# Patient Record
Sex: MALE | Race: OTHER
[De-identification: names, ages, dates, MRNs, and addresses within clinical notes are randomized per-mention and may not be internally consistent; named-entity substitution may affect disease eponyms.]

---

## 2020-12-13 ENCOUNTER — HOSPITAL ENCOUNTER (INPATIENT)
Dept: HOSPITAL 15 - ER | Age: 60
LOS: 2 days | Discharge: HOME | DRG: 199 | End: 2020-12-15
Attending: INTERNAL MEDICINE | Admitting: NURSE PRACTITIONER
Payer: COMMERCIAL

## 2020-12-13 VITALS — WEIGHT: 195.77 LBS | BODY MASS INDEX: 30.73 KG/M2 | HEIGHT: 67 IN

## 2020-12-13 DIAGNOSIS — Z23: ICD-10-CM

## 2020-12-13 DIAGNOSIS — I16.0: Primary | ICD-10-CM

## 2020-12-13 DIAGNOSIS — Z79.899: ICD-10-CM

## 2020-12-13 DIAGNOSIS — Z20.828: ICD-10-CM

## 2020-12-13 DIAGNOSIS — E66.9: ICD-10-CM

## 2020-12-13 DIAGNOSIS — E03.9: ICD-10-CM

## 2020-12-13 DIAGNOSIS — I10: ICD-10-CM

## 2020-12-13 LAB
ALBUMIN SERPL-MCNC: 3.4 G/DL (ref 3.4–5)
ALP SERPL-CCNC: 80 U/L (ref 45–117)
ALT SERPL-CCNC: 36 U/L (ref 16–61)
ANION GAP SERPL CALCULATED.3IONS-SCNC: 7 MMOL/L (ref 5–15)
APTT PPP: 26.2 SEC (ref 23–31.2)
BILIRUB SERPL-MCNC: 0.2 MG/DL (ref 0.2–1)
BUN SERPL-MCNC: 15 MG/DL (ref 7–18)
BUN/CREAT SERPL: 14.4
CALCIUM SERPL-MCNC: 8.7 MG/DL (ref 8.5–10.1)
CHLORIDE SERPL-SCNC: 107 MMOL/L (ref 98–107)
CO2 SERPL-SCNC: 27 MMOL/L (ref 21–32)
GLUCOSE SERPL-MCNC: 115 MG/DL (ref 74–106)
HCT VFR BLD AUTO: 48 % (ref 41–53)
HGB BLD-MCNC: 16.1 G/DL (ref 13.5–17.5)
INR PPP: 0.97 (ref 0.9–1.15)
MCH RBC QN AUTO: 27.5 PG (ref 28–32)
MCV RBC AUTO: 82.1 FL (ref 80–100)
NRBC BLD QL AUTO: 0.1 %
POTASSIUM SERPL-SCNC: 3.5 MMOL/L (ref 3.5–5.1)
PROT SERPL-MCNC: 6.9 G/DL (ref 6.4–8.2)
SODIUM SERPL-SCNC: 141 MMOL/L (ref 136–145)

## 2020-12-13 PROCEDURE — 85610 PROTHROMBIN TIME: CPT

## 2020-12-13 PROCEDURE — 78452 HT MUSCLE IMAGE SPECT MULT: CPT

## 2020-12-13 PROCEDURE — 36415 COLL VENOUS BLD VENIPUNCTURE: CPT

## 2020-12-13 PROCEDURE — 80053 COMPREHEN METABOLIC PANEL: CPT

## 2020-12-13 PROCEDURE — 84484 ASSAY OF TROPONIN QUANT: CPT

## 2020-12-13 PROCEDURE — 83735 ASSAY OF MAGNESIUM: CPT

## 2020-12-13 PROCEDURE — 80061 LIPID PANEL: CPT

## 2020-12-13 PROCEDURE — 85730 THROMBOPLASTIN TIME PARTIAL: CPT

## 2020-12-13 PROCEDURE — 84443 ASSAY THYROID STIM HORMONE: CPT

## 2020-12-13 PROCEDURE — 71045 X-RAY EXAM CHEST 1 VIEW: CPT

## 2020-12-13 PROCEDURE — 85025 COMPLETE CBC W/AUTO DIFF WBC: CPT

## 2020-12-13 PROCEDURE — 83880 ASSAY OF NATRIURETIC PEPTIDE: CPT

## 2020-12-13 PROCEDURE — 87426 SARSCOV CORONAVIRUS AG IA: CPT

## 2020-12-13 PROCEDURE — 93017 CV STRESS TEST TRACING ONLY: CPT

## 2020-12-13 PROCEDURE — 93005 ELECTROCARDIOGRAM TRACING: CPT

## 2020-12-14 VITALS — DIASTOLIC BLOOD PRESSURE: 94 MMHG | SYSTOLIC BLOOD PRESSURE: 161 MMHG

## 2020-12-14 LAB
CHOLEST SERPL-MCNC: 160 MG/DL (ref ?–200)
HDLC SERPL-MCNC: 32 MG/DL (ref 40–59)
MAGNESIUM SERPL-MCNC: 2.8 MG/DL (ref 1.6–2.6)
TRIGL SERPL-MCNC: 145 MG/DL (ref ?–150)

## 2020-12-14 RX ADMIN — FAMOTIDINE SCH MG: 20 TABLET, FILM COATED ORAL at 16:13

## 2020-12-14 RX ADMIN — METOPROLOL TARTRATE SCH MG: 25 TABLET, FILM COATED ORAL at 16:13

## 2020-12-14 RX ADMIN — ENOXAPARIN SODIUM SCH MG: 40 INJECTION SUBCUTANEOUS at 16:13

## 2020-12-15 VITALS — SYSTOLIC BLOOD PRESSURE: 151 MMHG | DIASTOLIC BLOOD PRESSURE: 95 MMHG

## 2020-12-15 VITALS — DIASTOLIC BLOOD PRESSURE: 79 MMHG | SYSTOLIC BLOOD PRESSURE: 139 MMHG

## 2020-12-15 RX ADMIN — FAMOTIDINE SCH MG: 20 TABLET, FILM COATED ORAL at 05:35

## 2020-12-15 RX ADMIN — METOPROLOL TARTRATE SCH MG: 25 TABLET, FILM COATED ORAL at 05:35

## 2020-12-15 RX ADMIN — FAMOTIDINE SCH MG: 20 TABLET, FILM COATED ORAL at 10:00

## 2020-12-15 RX ADMIN — METOPROLOL TARTRATE SCH MG: 25 TABLET, FILM COATED ORAL at 10:36

## 2020-12-15 RX ADMIN — ENOXAPARIN SODIUM SCH MG: 40 INJECTION SUBCUTANEOUS at 10:00

## 2020-12-15 NOTE — NUR
DISCHARGE INSTRUCTIONS AND RX GIVEN TO PT. VERBALIZED UNDERSTANDING. IV AND TELE BOX 
REMOVED. ALL APPROPRIATE PAPERWORK SIGNED. PT DISCHARGED HOME WITH FAMILY IN ATTENDANCE.

## 2020-12-15 NOTE — NUR
Telemetry admit from ER

MARCIA LEMUS admitted to Telemetry. Patient oriented to Kim Post, primary RN, unit, 
room, bed, and unit policies regarding patient care and visiting hours. Patient now on 
continuous telemetry monitoring, tele box #38 and telemetry reading on arrival to unit is SR 
70. Patient IS ON ROOM AIR; SAT O2 IS 94%, weighed by bed scale and encouraged to call if 
they need something. All questions and concerns addressed, patient verbalized understanding. 


PATIENT DENIES CHEST PAIN AND DENIES CHEST PRESSURE AT THIS TIME. PATIENT BELONGINGS FORM 
SIGNED AND CHARTED. WILL CONTINUE TO MONITOR Q1H AND PRN.

## 2020-12-15 NOTE — NUR
CLOSING NOTE- NOC SHIFT

ENDORSED PATIENT CARE TO DAY SHIFT NURSE RICHELLE RAMOS.

PATIENT IS COMFORTABLE IN BED. NO S/SX OF DISTRESS, SOB OR PAIN.

## 2020-12-15 NOTE — NUR
Regarding social service consult for advance directive, no insurance, no PCP. 



Patient discharge prior to assessment.

## 2021-10-11 ENCOUNTER — HOSPITAL ENCOUNTER (INPATIENT)
Dept: HOSPITAL 15 - ER | Age: 61
LOS: 3 days | Discharge: HOME | DRG: 203 | End: 2021-10-14
Attending: FAMILY MEDICINE | Admitting: NURSE PRACTITIONER
Payer: MEDICAID

## 2021-10-11 VITALS — SYSTOLIC BLOOD PRESSURE: 132 MMHG | DIASTOLIC BLOOD PRESSURE: 77 MMHG

## 2021-10-11 VITALS — HEIGHT: 67 IN | WEIGHT: 226.41 LBS | BODY MASS INDEX: 35.54 KG/M2

## 2021-10-11 VITALS — DIASTOLIC BLOOD PRESSURE: 68 MMHG | SYSTOLIC BLOOD PRESSURE: 127 MMHG

## 2021-10-11 VITALS — DIASTOLIC BLOOD PRESSURE: 84 MMHG | SYSTOLIC BLOOD PRESSURE: 170 MMHG

## 2021-10-11 VITALS — SYSTOLIC BLOOD PRESSURE: 127 MMHG | DIASTOLIC BLOOD PRESSURE: 68 MMHG

## 2021-10-11 DIAGNOSIS — E66.9: ICD-10-CM

## 2021-10-11 DIAGNOSIS — R07.89: Primary | ICD-10-CM

## 2021-10-11 DIAGNOSIS — Z82.49: ICD-10-CM

## 2021-10-11 DIAGNOSIS — Z86.16: ICD-10-CM

## 2021-10-11 DIAGNOSIS — G47.33: ICD-10-CM

## 2021-10-11 DIAGNOSIS — E78.5: ICD-10-CM

## 2021-10-11 DIAGNOSIS — I10: ICD-10-CM

## 2021-10-11 DIAGNOSIS — Z20.822: ICD-10-CM

## 2021-10-11 DIAGNOSIS — E78.1: ICD-10-CM

## 2021-10-11 LAB
ALBUMIN SERPL-MCNC: 3.4 G/DL (ref 3.4–5)
ALBUMIN SERPL-MCNC: 3.6 G/DL (ref 3.4–5)
ALP SERPL-CCNC: 80 U/L (ref 45–117)
ALP SERPL-CCNC: 83 U/L (ref 45–117)
ALT SERPL-CCNC: 54 U/L (ref 16–61)
ALT SERPL-CCNC: 54 U/L (ref 16–61)
ANION GAP SERPL CALCULATED.3IONS-SCNC: 4 MMOL/L (ref 5–15)
ANION GAP SERPL CALCULATED.3IONS-SCNC: 4 MMOL/L (ref 5–15)
BILIRUB SERPL-MCNC: 0.3 MG/DL (ref 0.2–1)
BILIRUB SERPL-MCNC: 0.4 MG/DL (ref 0.2–1)
BUN SERPL-MCNC: 17 MG/DL (ref 7–18)
BUN SERPL-MCNC: 22 MG/DL (ref 7–18)
BUN/CREAT SERPL: 16.2
BUN/CREAT SERPL: 20.6
CALCIUM SERPL-MCNC: 9.2 MG/DL (ref 8.5–10.1)
CALCIUM SERPL-MCNC: 9.5 MG/DL (ref 8.5–10.1)
CHLORIDE SERPL-SCNC: 109 MMOL/L (ref 98–107)
CHLORIDE SERPL-SCNC: 110 MMOL/L (ref 98–107)
CHOLEST SERPL-MCNC: 127 MG/DL (ref ?–200)
CO2 SERPL-SCNC: 28 MMOL/L (ref 21–32)
CO2 SERPL-SCNC: 29 MMOL/L (ref 21–32)
GLUCOSE SERPL-MCNC: 110 MG/DL (ref 74–106)
GLUCOSE SERPL-MCNC: 118 MG/DL (ref 74–106)
HCT VFR BLD AUTO: 48.3 % (ref 41–53)
HCT VFR BLD AUTO: 48.8 % (ref 41–53)
HDLC SERPL-MCNC: 31 MG/DL (ref 40–59)
HGB BLD-MCNC: 16.3 G/DL (ref 13.5–17.5)
HGB BLD-MCNC: 16.4 G/DL (ref 13.5–17.5)
INR PPP: 1.02 (ref 0.9–1.15)
MCH RBC QN AUTO: 27.9 PG (ref 28–32)
MCH RBC QN AUTO: 28 PG (ref 28–32)
MCV RBC AUTO: 82.6 FL (ref 80–100)
MCV RBC AUTO: 83.4 FL (ref 80–100)
NRBC BLD QL AUTO: 0.1 %
NRBC BLD QL AUTO: 0.2 %
POTASSIUM SERPL-SCNC: 4.7 MMOL/L (ref 3.5–5.1)
POTASSIUM SERPL-SCNC: 5.1 MMOL/L (ref 3.5–5.1)
PROT SERPL-MCNC: 6.8 G/DL (ref 6.4–8.2)
PROT SERPL-MCNC: 7 G/DL (ref 6.4–8.2)
SODIUM SERPL-SCNC: 141 MMOL/L (ref 136–145)
SODIUM SERPL-SCNC: 143 MMOL/L (ref 136–145)
TRIGL SERPL-MCNC: 315 MG/DL (ref ?–150)

## 2021-10-11 PROCEDURE — 84439 ASSAY OF FREE THYROXINE: CPT

## 2021-10-11 PROCEDURE — 71045 X-RAY EXAM CHEST 1 VIEW: CPT

## 2021-10-11 PROCEDURE — 93017 CV STRESS TEST TRACING ONLY: CPT

## 2021-10-11 PROCEDURE — 83880 ASSAY OF NATRIURETIC PEPTIDE: CPT

## 2021-10-11 PROCEDURE — 96374 THER/PROPH/DIAG INJ IV PUSH: CPT

## 2021-10-11 PROCEDURE — 85025 COMPLETE CBC W/AUTO DIFF WBC: CPT

## 2021-10-11 PROCEDURE — 85379 FIBRIN DEGRADATION QUANT: CPT

## 2021-10-11 PROCEDURE — 87426 SARSCOV CORONAVIRUS AG IA: CPT

## 2021-10-11 PROCEDURE — 82805 BLOOD GASES W/O2 SATURATION: CPT

## 2021-10-11 PROCEDURE — 93306 TTE W/DOPPLER COMPLETE: CPT

## 2021-10-11 PROCEDURE — 93005 ELECTROCARDIOGRAM TRACING: CPT

## 2021-10-11 PROCEDURE — 80061 LIPID PANEL: CPT

## 2021-10-11 PROCEDURE — 84443 ASSAY THYROID STIM HORMONE: CPT

## 2021-10-11 PROCEDURE — 83036 HEMOGLOBIN GLYCOSYLATED A1C: CPT

## 2021-10-11 PROCEDURE — 36600 WITHDRAWAL OF ARTERIAL BLOOD: CPT

## 2021-10-11 PROCEDURE — 80053 COMPREHEN METABOLIC PANEL: CPT

## 2021-10-11 PROCEDURE — 96375 TX/PRO/DX INJ NEW DRUG ADDON: CPT

## 2021-10-11 PROCEDURE — 84484 ASSAY OF TROPONIN QUANT: CPT

## 2021-10-11 PROCEDURE — 85610 PROTHROMBIN TIME: CPT

## 2021-10-11 PROCEDURE — 36415 COLL VENOUS BLD VENIPUNCTURE: CPT

## 2021-10-11 RX ADMIN — ATORVASTATIN CALCIUM SCH MG: 20 TABLET, FILM COATED ORAL at 23:35

## 2021-10-11 RX ADMIN — METOPROLOL TARTRATE SCH MG: 25 TABLET, FILM COATED ORAL at 23:35

## 2021-10-11 RX ADMIN — MULTIVITAMIN TABLET SCH TAB: TABLET at 11:20

## 2021-10-11 RX ADMIN — METOPROLOL TARTRATE SCH MG: 25 TABLET, FILM COATED ORAL at 22:00

## 2021-10-11 RX ADMIN — METOPROLOL TARTRATE SCH MG: 25 TABLET, FILM COATED ORAL at 11:19

## 2021-10-11 RX ADMIN — HYDROCODONE BITARTRATE AND ACETAMINOPHEN PRN TAB: 5; 325 TABLET ORAL at 15:58

## 2021-10-11 RX ADMIN — SODIUM CHLORIDE SCH ML: 9 INJECTION INTRAMUSCULAR; INTRAVENOUS; SUBCUTANEOUS at 23:35

## 2021-10-11 RX ADMIN — SODIUM CHLORIDE SCH ML: 9 INJECTION INTRAMUSCULAR; INTRAVENOUS; SUBCUTANEOUS at 13:02

## 2021-10-12 VITALS — DIASTOLIC BLOOD PRESSURE: 89 MMHG | SYSTOLIC BLOOD PRESSURE: 147 MMHG

## 2021-10-12 VITALS — DIASTOLIC BLOOD PRESSURE: 72 MMHG | SYSTOLIC BLOOD PRESSURE: 123 MMHG

## 2021-10-12 VITALS — DIASTOLIC BLOOD PRESSURE: 79 MMHG | SYSTOLIC BLOOD PRESSURE: 145 MMHG

## 2021-10-12 VITALS — SYSTOLIC BLOOD PRESSURE: 145 MMHG | DIASTOLIC BLOOD PRESSURE: 81 MMHG

## 2021-10-12 VITALS — SYSTOLIC BLOOD PRESSURE: 129 MMHG | DIASTOLIC BLOOD PRESSURE: 81 MMHG

## 2021-10-12 LAB
ALBUMIN SERPL-MCNC: 3.1 G/DL (ref 3.4–5)
ALP SERPL-CCNC: 78 U/L (ref 45–117)
ALT SERPL-CCNC: 49 U/L (ref 16–61)
ANION GAP SERPL CALCULATED.3IONS-SCNC: 5 MMOL/L (ref 5–15)
BILIRUB SERPL-MCNC: 0.6 MG/DL (ref 0.2–1)
BUN SERPL-MCNC: 15 MG/DL (ref 7–18)
BUN/CREAT SERPL: 14.4
CALCIUM SERPL-MCNC: 8.6 MG/DL (ref 8.5–10.1)
CHLORIDE SERPL-SCNC: 110 MMOL/L (ref 98–107)
CO2 SERPL-SCNC: 25 MMOL/L (ref 21–32)
GLUCOSE SERPL-MCNC: 109 MG/DL (ref 74–106)
HCT VFR BLD AUTO: 49.8 % (ref 41–53)
HGB BLD-MCNC: 16.6 G/DL (ref 13.5–17.5)
MCH RBC QN AUTO: 27.6 PG (ref 28–32)
MCV RBC AUTO: 82.5 FL (ref 80–100)
NRBC BLD QL AUTO: 0.1 %
POTASSIUM SERPL-SCNC: 4.3 MMOL/L (ref 3.5–5.1)
PROT SERPL-MCNC: 6.6 G/DL (ref 6.4–8.2)
SODIUM SERPL-SCNC: 140 MMOL/L (ref 136–145)

## 2021-10-12 RX ADMIN — ATORVASTATIN CALCIUM SCH MG: 20 TABLET, FILM COATED ORAL at 22:41

## 2021-10-12 RX ADMIN — SODIUM CHLORIDE SCH ML: 9 INJECTION INTRAMUSCULAR; INTRAVENOUS; SUBCUTANEOUS at 05:14

## 2021-10-12 RX ADMIN — FAMOTIDINE SCH MG: 20 TABLET, FILM COATED ORAL at 10:45

## 2021-10-12 RX ADMIN — SODIUM CHLORIDE SCH ML: 9 INJECTION INTRAMUSCULAR; INTRAVENOUS; SUBCUTANEOUS at 22:41

## 2021-10-12 RX ADMIN — MULTIVITAMIN TABLET SCH TAB: TABLET at 10:45

## 2021-10-12 RX ADMIN — HYDROCODONE BITARTRATE AND ACETAMINOPHEN PRN TAB: 5; 325 TABLET ORAL at 22:43

## 2021-10-12 RX ADMIN — METOPROLOL TARTRATE SCH MG: 25 TABLET, FILM COATED ORAL at 22:42

## 2021-10-12 RX ADMIN — METOPROLOL TARTRATE SCH MG: 25 TABLET, FILM COATED ORAL at 10:45

## 2021-10-12 RX ADMIN — SODIUM CHLORIDE SCH ML: 9 INJECTION INTRAMUSCULAR; INTRAVENOUS; SUBCUTANEOUS at 17:16

## 2021-10-13 VITALS — DIASTOLIC BLOOD PRESSURE: 89 MMHG | SYSTOLIC BLOOD PRESSURE: 144 MMHG

## 2021-10-13 VITALS — SYSTOLIC BLOOD PRESSURE: 139 MMHG | DIASTOLIC BLOOD PRESSURE: 90 MMHG

## 2021-10-13 VITALS — SYSTOLIC BLOOD PRESSURE: 142 MMHG | DIASTOLIC BLOOD PRESSURE: 88 MMHG

## 2021-10-13 VITALS — SYSTOLIC BLOOD PRESSURE: 147 MMHG | DIASTOLIC BLOOD PRESSURE: 87 MMHG

## 2021-10-13 VITALS — SYSTOLIC BLOOD PRESSURE: 120 MMHG | DIASTOLIC BLOOD PRESSURE: 77 MMHG

## 2021-10-13 VITALS — DIASTOLIC BLOOD PRESSURE: 88 MMHG | SYSTOLIC BLOOD PRESSURE: 142 MMHG

## 2021-10-13 RX ADMIN — FAMOTIDINE SCH MG: 20 TABLET, FILM COATED ORAL at 10:09

## 2021-10-13 RX ADMIN — MULTIVITAMIN TABLET SCH TAB: TABLET at 10:00

## 2021-10-13 RX ADMIN — METOPROLOL TARTRATE SCH MG: 25 TABLET, FILM COATED ORAL at 10:00

## 2021-10-13 RX ADMIN — SODIUM CHLORIDE SCH ML: 9 INJECTION INTRAMUSCULAR; INTRAVENOUS; SUBCUTANEOUS at 14:30

## 2021-10-13 RX ADMIN — METOPROLOL TARTRATE SCH MG: 25 TABLET, FILM COATED ORAL at 22:06

## 2021-10-13 RX ADMIN — SODIUM CHLORIDE SCH ML: 9 INJECTION INTRAMUSCULAR; INTRAVENOUS; SUBCUTANEOUS at 05:34

## 2021-10-13 RX ADMIN — SODIUM CHLORIDE SCH ML: 9 INJECTION INTRAMUSCULAR; INTRAVENOUS; SUBCUTANEOUS at 22:05

## 2021-10-14 VITALS — SYSTOLIC BLOOD PRESSURE: 121 MMHG | DIASTOLIC BLOOD PRESSURE: 79 MMHG

## 2021-10-14 VITALS — SYSTOLIC BLOOD PRESSURE: 118 MMHG | DIASTOLIC BLOOD PRESSURE: 58 MMHG

## 2021-10-14 VITALS — SYSTOLIC BLOOD PRESSURE: 118 MMHG | DIASTOLIC BLOOD PRESSURE: 80 MMHG

## 2021-10-14 VITALS — SYSTOLIC BLOOD PRESSURE: 140 MMHG | DIASTOLIC BLOOD PRESSURE: 84 MMHG

## 2021-10-14 VITALS — SYSTOLIC BLOOD PRESSURE: 126 MMHG | DIASTOLIC BLOOD PRESSURE: 79 MMHG

## 2021-10-14 VITALS — DIASTOLIC BLOOD PRESSURE: 79 MMHG | SYSTOLIC BLOOD PRESSURE: 121 MMHG

## 2021-10-14 RX ADMIN — MULTIVITAMIN TABLET SCH TAB: TABLET at 09:26

## 2021-10-14 RX ADMIN — SODIUM CHLORIDE SCH ML: 9 INJECTION INTRAMUSCULAR; INTRAVENOUS; SUBCUTANEOUS at 14:37

## 2021-10-14 RX ADMIN — METOPROLOL TARTRATE SCH MG: 25 TABLET, FILM COATED ORAL at 09:28

## 2021-10-14 RX ADMIN — SODIUM CHLORIDE SCH ML: 9 INJECTION INTRAMUSCULAR; INTRAVENOUS; SUBCUTANEOUS at 06:14

## 2021-10-14 RX ADMIN — FAMOTIDINE SCH MG: 20 TABLET, FILM COATED ORAL at 09:26

## 2022-06-16 ENCOUNTER — HOSPITAL ENCOUNTER (EMERGENCY)
Dept: HOSPITAL 15 - ER | Age: 62
Discharge: HOME | End: 2022-06-16
Payer: MEDICAID

## 2022-06-16 VITALS — DIASTOLIC BLOOD PRESSURE: 95 MMHG | SYSTOLIC BLOOD PRESSURE: 146 MMHG

## 2022-06-16 VITALS — HEIGHT: 67 IN | WEIGHT: 210 LBS | BODY MASS INDEX: 32.96 KG/M2

## 2022-06-16 DIAGNOSIS — I10: ICD-10-CM

## 2022-06-16 DIAGNOSIS — J18.9: Primary | ICD-10-CM

## 2022-06-16 DIAGNOSIS — I25.10: ICD-10-CM

## 2022-06-16 LAB
ALBUMIN SERPL-MCNC: 3.9 G/DL (ref 3.4–5)
ALP SERPL-CCNC: 102 U/L (ref 45–117)
ALT SERPL-CCNC: 54 U/L (ref 16–61)
ANION GAP SERPL CALCULATED.3IONS-SCNC: 7 MMOL/L (ref 5–15)
BILIRUB SERPL-MCNC: 0.6 MG/DL (ref 0.2–1)
BUN SERPL-MCNC: 12 MG/DL (ref 7–18)
BUN/CREAT SERPL: 11.7
CALCIUM SERPL-MCNC: 9.2 MG/DL (ref 8.5–10.1)
CHLORIDE SERPL-SCNC: 109 MMOL/L (ref 98–107)
CO2 SERPL-SCNC: 28 MMOL/L (ref 21–32)
GLUCOSE SERPL-MCNC: 105 MG/DL (ref 74–106)
HCT VFR BLD AUTO: 48.8 % (ref 41–53)
HGB BLD-MCNC: 16.2 G/DL (ref 13.5–17.5)
MCH RBC QN AUTO: 27.2 PG (ref 28–32)
MCV RBC AUTO: 82.1 FL (ref 80–100)
NRBC BLD QL AUTO: 0.1 %
POTASSIUM SERPL-SCNC: 4.7 MMOL/L (ref 3.5–5.1)
PROT SERPL-MCNC: 7.8 G/DL (ref 6.4–8.2)
SODIUM SERPL-SCNC: 144 MMOL/L (ref 136–145)

## 2022-06-16 PROCEDURE — 85025 COMPLETE CBC W/AUTO DIFF WBC: CPT

## 2022-06-16 PROCEDURE — 93005 ELECTROCARDIOGRAM TRACING: CPT

## 2022-06-16 PROCEDURE — 84484 ASSAY OF TROPONIN QUANT: CPT

## 2022-06-16 PROCEDURE — 71045 X-RAY EXAM CHEST 1 VIEW: CPT

## 2022-06-16 PROCEDURE — 80053 COMPREHEN METABOLIC PANEL: CPT

## 2022-06-16 PROCEDURE — 85379 FIBRIN DEGRADATION QUANT: CPT

## 2022-06-16 PROCEDURE — 36415 COLL VENOUS BLD VENIPUNCTURE: CPT

## 2022-11-28 ENCOUNTER — HOSPITAL ENCOUNTER (EMERGENCY)
Dept: HOSPITAL 15 - ER | Age: 62
Discharge: HOME | End: 2022-11-28
Payer: MEDICAID

## 2022-11-28 VITALS — DIASTOLIC BLOOD PRESSURE: 109 MMHG | SYSTOLIC BLOOD PRESSURE: 178 MMHG

## 2022-11-28 VITALS — HEIGHT: 62 IN | BODY MASS INDEX: 38.54 KG/M2 | WEIGHT: 209.44 LBS

## 2022-11-28 DIAGNOSIS — I10: ICD-10-CM

## 2022-11-28 DIAGNOSIS — R07.89: Primary | ICD-10-CM

## 2022-11-28 LAB
ALBUMIN SERPL-MCNC: 3.9 G/DL (ref 3.4–5)
ALP SERPL-CCNC: 70 U/L (ref 45–117)
ALT SERPL-CCNC: 95 U/L (ref 16–61)
ANION GAP SERPL CALCULATED.3IONS-SCNC: 9 MMOL/L (ref 5–15)
BILIRUB SERPL-MCNC: 0.6 MG/DL (ref 0.2–1)
BUN SERPL-MCNC: 15 MG/DL (ref 7–18)
BUN/CREAT SERPL: 12.6
CALCIUM SERPL-MCNC: 9.6 MG/DL (ref 8.5–10.1)
CHLORIDE SERPL-SCNC: 107 MMOL/L (ref 98–107)
CO2 SERPL-SCNC: 26 MMOL/L (ref 21–32)
GLUCOSE SERPL-MCNC: 109 MG/DL (ref 74–106)
HCT VFR BLD AUTO: 51 % (ref 41–53)
HGB BLD-MCNC: 16.8 G/DL (ref 13.5–17.5)
MCH RBC QN AUTO: 27 PG (ref 28–32)
MCV RBC AUTO: 81.8 FL (ref 80–100)
NRBC BLD QL AUTO: 0.1 %
POTASSIUM SERPL-SCNC: 4.2 MMOL/L (ref 3.5–5.1)
PROT SERPL-MCNC: 7.3 G/DL (ref 6.4–8.2)
SODIUM SERPL-SCNC: 142 MMOL/L (ref 136–145)

## 2022-11-28 PROCEDURE — 83880 ASSAY OF NATRIURETIC PEPTIDE: CPT

## 2022-11-28 PROCEDURE — 36415 COLL VENOUS BLD VENIPUNCTURE: CPT

## 2022-11-28 PROCEDURE — 85025 COMPLETE CBC W/AUTO DIFF WBC: CPT

## 2022-11-28 PROCEDURE — 93005 ELECTROCARDIOGRAM TRACING: CPT

## 2022-11-28 PROCEDURE — 85379 FIBRIN DEGRADATION QUANT: CPT

## 2022-11-28 PROCEDURE — 80053 COMPREHEN METABOLIC PANEL: CPT

## 2022-11-28 PROCEDURE — 71045 X-RAY EXAM CHEST 1 VIEW: CPT

## 2022-11-28 PROCEDURE — 84484 ASSAY OF TROPONIN QUANT: CPT

## 2024-10-20 ENCOUNTER — HOSPITAL ENCOUNTER (INPATIENT)
Dept: HOSPITAL 15 - ER | Age: 64
LOS: 3 days | Discharge: HOME | DRG: 192 | End: 2024-10-23
Attending: STUDENT IN AN ORGANIZED HEALTH CARE EDUCATION/TRAINING PROGRAM | Admitting: STUDENT IN AN ORGANIZED HEALTH CARE EDUCATION/TRAINING PROGRAM
Payer: MEDICAID

## 2024-10-20 VITALS — HEART RATE: 60 BPM | OXYGEN SATURATION: 98 % | RESPIRATION RATE: 17 BRPM

## 2024-10-20 VITALS — OXYGEN SATURATION: 97 %

## 2024-10-20 VITALS
SYSTOLIC BLOOD PRESSURE: 141 MMHG | RESPIRATION RATE: 18 BRPM | OXYGEN SATURATION: 97 % | TEMPERATURE: 98.3 F | DIASTOLIC BLOOD PRESSURE: 70 MMHG | HEART RATE: 52 BPM

## 2024-10-20 VITALS — BODY MASS INDEX: 33.43 KG/M2 | WEIGHT: 212.97 LBS | HEIGHT: 67 IN

## 2024-10-20 DIAGNOSIS — Z82.49: ICD-10-CM

## 2024-10-20 DIAGNOSIS — Z79.899: ICD-10-CM

## 2024-10-20 DIAGNOSIS — E66.9: ICD-10-CM

## 2024-10-20 DIAGNOSIS — I10: ICD-10-CM

## 2024-10-20 DIAGNOSIS — Z79.82: ICD-10-CM

## 2024-10-20 DIAGNOSIS — I44.0: ICD-10-CM

## 2024-10-20 DIAGNOSIS — N17.0: ICD-10-CM

## 2024-10-20 DIAGNOSIS — G47.33: ICD-10-CM

## 2024-10-20 DIAGNOSIS — D69.6: ICD-10-CM

## 2024-10-20 DIAGNOSIS — M94.0: Primary | ICD-10-CM

## 2024-10-20 DIAGNOSIS — E78.5: ICD-10-CM

## 2024-10-20 LAB
ALBUMIN SERPL-MCNC: 4.4 G/DL (ref 3.2–4.8)
ALP SERPL-CCNC: 69 U/L (ref 46–116)
ALT SERPL-CCNC: 33 U/L (ref 7–40)
ANION GAP SERPL CALCULATED.3IONS-SCNC: 6 MMOL/L (ref 5–15)
BILIRUB SERPL-MCNC: 0.4 MG/DL (ref 0.2–1)
BUN SERPL-MCNC: 16 MG/DL (ref 9–23)
BUN/CREAT SERPL: 11.7 (ref 10–20)
CALCIUM SERPL-MCNC: 9.7 MG/DL (ref 8.7–10.4)
CHLORIDE SERPL-SCNC: 110 MMOL/L (ref 98–107)
CO2 SERPL-SCNC: 27 MMOL/L (ref 20–31)
GLUCOSE SERPL-MCNC: 123 MG/DL (ref 74–106)
HCT VFR BLD AUTO: 47.4 % (ref 41–53)
HGB BLD-MCNC: 15.9 G/DL (ref 13.5–17.5)
MCH RBC QN AUTO: 27.5 PG (ref 28–32)
MCV RBC AUTO: 82.1 FL (ref 80–100)
NRBC BLD QL AUTO: 0.1 %
POTASSIUM SERPL-SCNC: 3.9 MMOL/L (ref 3.5–5.1)
PROT SERPL-MCNC: 6.9 G/DL (ref 5.7–8.2)
SODIUM SERPL-SCNC: 143 MMOL/L (ref 136–145)

## 2024-10-20 PROCEDURE — 70490 CT SOFT TISSUE NECK W/O DYE: CPT

## 2024-10-20 PROCEDURE — 71046 X-RAY EXAM CHEST 2 VIEWS: CPT

## 2024-10-20 PROCEDURE — 86803 HEPATITIS C AB TEST: CPT

## 2024-10-20 PROCEDURE — 85610 PROTHROMBIN TIME: CPT

## 2024-10-20 PROCEDURE — 85025 COMPLETE CBC W/AUTO DIFF WBC: CPT

## 2024-10-20 PROCEDURE — 82570 ASSAY OF URINE CREATININE: CPT

## 2024-10-20 PROCEDURE — 86850 RBC ANTIBODY SCREEN: CPT

## 2024-10-20 PROCEDURE — 99152 MOD SED SAME PHYS/QHP 5/>YRS: CPT

## 2024-10-20 PROCEDURE — 93005 ELECTROCARDIOGRAM TRACING: CPT

## 2024-10-20 PROCEDURE — 86900 BLOOD TYPING SEROLOGIC ABO: CPT

## 2024-10-20 PROCEDURE — 93306 TTE W/DOPPLER COMPLETE: CPT

## 2024-10-20 PROCEDURE — 84300 ASSAY OF URINE SODIUM: CPT

## 2024-10-20 PROCEDURE — 80307 DRUG TEST PRSMV CHEM ANLYZR: CPT

## 2024-10-20 PROCEDURE — 99291 CRITICAL CARE FIRST HOUR: CPT

## 2024-10-20 PROCEDURE — 84484 ASSAY OF TROPONIN QUANT: CPT

## 2024-10-20 PROCEDURE — 76775 US EXAM ABDO BACK WALL LIM: CPT

## 2024-10-20 PROCEDURE — 80053 COMPREHEN METABOLIC PANEL: CPT

## 2024-10-20 PROCEDURE — 93458 L HRT ARTERY/VENTRICLE ANGIO: CPT

## 2024-10-20 PROCEDURE — 87340 HEPATITIS B SURFACE AG IA: CPT

## 2024-10-20 PROCEDURE — 85730 THROMBOPLASTIN TIME PARTIAL: CPT

## 2024-10-20 PROCEDURE — 86901 BLOOD TYPING SEROLOGIC RH(D): CPT

## 2024-10-20 PROCEDURE — 36415 COLL VENOUS BLD VENIPUNCTURE: CPT

## 2024-10-20 RX ADMIN — ENOXAPARIN SODIUM SCH MG: 40 INJECTION SUBCUTANEOUS at 14:44

## 2024-10-20 RX ADMIN — ATORVASTATIN CALCIUM SCH MG: 20 TABLET, FILM COATED ORAL at 22:18

## 2024-10-20 RX ADMIN — SODIUM CHLORIDE SCH ML: 9 INJECTION INTRAMUSCULAR; INTRAVENOUS; SUBCUTANEOUS at 14:23

## 2024-10-20 RX ADMIN — SODIUM CHLORIDE, SODIUM LACTATE, POTASSIUM CHLORIDE, AND CALCIUM CHLORIDE ONE MLS/HR: .6; .31; .03; .02 INJECTION, SOLUTION INTRAVENOUS at 14:43

## 2024-10-20 RX ADMIN — METOPROLOL TARTRATE SCH MG: 25 TABLET, FILM COATED ORAL at 23:00

## 2024-10-20 RX ADMIN — NITROGLYCERIN PRN MG: 0.4 TABLET SUBLINGUAL at 18:54

## 2024-10-21 VITALS
DIASTOLIC BLOOD PRESSURE: 81 MMHG | HEART RATE: 72 BPM | TEMPERATURE: 97.9 F | RESPIRATION RATE: 14 BRPM | SYSTOLIC BLOOD PRESSURE: 136 MMHG | OXYGEN SATURATION: 98 %

## 2024-10-21 VITALS — OXYGEN SATURATION: 99 %

## 2024-10-21 VITALS — OXYGEN SATURATION: 95 % | HEART RATE: 57 BPM | RESPIRATION RATE: 17 BRPM

## 2024-10-21 VITALS
SYSTOLIC BLOOD PRESSURE: 178 MMHG | OXYGEN SATURATION: 99 % | HEART RATE: 75 BPM | RESPIRATION RATE: 18 BRPM | TEMPERATURE: 98.5 F | DIASTOLIC BLOOD PRESSURE: 97 MMHG

## 2024-10-21 VITALS — HEART RATE: 62 BPM

## 2024-10-21 VITALS — RESPIRATION RATE: 18 BRPM | HEART RATE: 64 BPM | OXYGEN SATURATION: 99 %

## 2024-10-21 VITALS — OXYGEN SATURATION: 95 %

## 2024-10-21 LAB
ALBUMIN SERPL-MCNC: 4.2 G/DL (ref 3.2–4.8)
ALP SERPL-CCNC: 60 U/L (ref 46–116)
ALT SERPL-CCNC: 31 U/L (ref 7–40)
ANION GAP SERPL CALCULATED.3IONS-SCNC: 5 MMOL/L (ref 5–15)
BILIRUB SERPL-MCNC: 0.8 MG/DL (ref 0.2–1)
BUN SERPL-MCNC: 13 MG/DL (ref 9–23)
BUN/CREAT SERPL: 11.1 (ref 10–20)
CALCIUM SERPL-MCNC: 9.9 MG/DL (ref 8.7–10.4)
CHLORIDE SERPL-SCNC: 109 MMOL/L (ref 98–107)
CO2 SERPL-SCNC: 28 MMOL/L (ref 20–31)
GLUCOSE SERPL-MCNC: 101 MG/DL (ref 74–106)
HCT VFR BLD AUTO: 48.3 % (ref 41–53)
HGB BLD-MCNC: 16.2 G/DL (ref 13.5–17.5)
MCH RBC QN AUTO: 27.6 PG (ref 28–32)
MCV RBC AUTO: 82.5 FL (ref 80–100)
NRBC BLD QL AUTO: 0.1 %
POTASSIUM SERPL-SCNC: 4.5 MMOL/L (ref 3.5–5.1)
PROT SERPL-MCNC: 6.6 G/DL (ref 5.7–8.2)
SODIUM SERPL-SCNC: 142 MMOL/L (ref 136–145)

## 2024-10-21 RX ADMIN — ALBUTEROL SULFATE SCH MG: 90 AEROSOL, METERED RESPIRATORY (INHALATION) at 10:00

## 2024-10-21 RX ADMIN — ISOSORBIDE MONONITRATE SCH MG: 60 TABLET, EXTENDED RELEASE ORAL at 21:01

## 2024-10-21 RX ADMIN — ASPIRIN SCH MG: 81 TABLET ORAL at 10:00

## 2024-10-21 RX ADMIN — VITAMIN D, TAB 1000IU (100/BT) SCH UNIT: 25 TAB at 10:47

## 2024-10-22 VITALS
SYSTOLIC BLOOD PRESSURE: 133 MMHG | TEMPERATURE: 97.4 F | RESPIRATION RATE: 11 BRPM | HEART RATE: 69 BPM | DIASTOLIC BLOOD PRESSURE: 81 MMHG | OXYGEN SATURATION: 94 %

## 2024-10-22 VITALS
RESPIRATION RATE: 17 BRPM | OXYGEN SATURATION: 94 % | DIASTOLIC BLOOD PRESSURE: 76 MMHG | HEART RATE: 71 BPM | TEMPERATURE: 98.1 F | SYSTOLIC BLOOD PRESSURE: 143 MMHG

## 2024-10-22 VITALS
SYSTOLIC BLOOD PRESSURE: 126 MMHG | HEART RATE: 50 BPM | TEMPERATURE: 98 F | DIASTOLIC BLOOD PRESSURE: 73 MMHG | OXYGEN SATURATION: 98 % | RESPIRATION RATE: 16 BRPM

## 2024-10-22 VITALS
DIASTOLIC BLOOD PRESSURE: 69 MMHG | SYSTOLIC BLOOD PRESSURE: 115 MMHG | OXYGEN SATURATION: 96 % | HEART RATE: 60 BPM | TEMPERATURE: 98 F | RESPIRATION RATE: 15 BRPM

## 2024-10-22 VITALS
OXYGEN SATURATION: 96 % | RESPIRATION RATE: 15 BRPM | SYSTOLIC BLOOD PRESSURE: 115 MMHG | DIASTOLIC BLOOD PRESSURE: 65 MMHG | HEART RATE: 53 BPM

## 2024-10-22 VITALS
TEMPERATURE: 97.8 F | SYSTOLIC BLOOD PRESSURE: 127 MMHG | DIASTOLIC BLOOD PRESSURE: 74 MMHG | OXYGEN SATURATION: 99 % | RESPIRATION RATE: 16 BRPM | HEART RATE: 58 BPM

## 2024-10-22 VITALS
RESPIRATION RATE: 15 BRPM | SYSTOLIC BLOOD PRESSURE: 119 MMHG | HEART RATE: 54 BPM | OXYGEN SATURATION: 95 % | DIASTOLIC BLOOD PRESSURE: 66 MMHG

## 2024-10-22 VITALS
OXYGEN SATURATION: 93 % | DIASTOLIC BLOOD PRESSURE: 84 MMHG | SYSTOLIC BLOOD PRESSURE: 166 MMHG | HEART RATE: 86 BPM | TEMPERATURE: 98.3 F | RESPIRATION RATE: 17 BRPM

## 2024-10-22 VITALS
OXYGEN SATURATION: 94 % | DIASTOLIC BLOOD PRESSURE: 67 MMHG | RESPIRATION RATE: 15 BRPM | HEART RATE: 56 BPM | SYSTOLIC BLOOD PRESSURE: 117 MMHG

## 2024-10-22 VITALS
OXYGEN SATURATION: 94 % | DIASTOLIC BLOOD PRESSURE: 65 MMHG | HEART RATE: 57 BPM | RESPIRATION RATE: 12 BRPM | SYSTOLIC BLOOD PRESSURE: 115 MMHG

## 2024-10-22 VITALS — OXYGEN SATURATION: 97 %

## 2024-10-22 VITALS — HEART RATE: 56 BPM

## 2024-10-22 VITALS — HEART RATE: 77 BPM

## 2024-10-22 LAB
ALBUMIN SERPL-MCNC: 4.1 G/DL (ref 3.2–4.8)
ALP SERPL-CCNC: 60 U/L (ref 46–116)
ALT SERPL-CCNC: 27 U/L (ref 7–40)
ANION GAP SERPL CALCULATED.3IONS-SCNC: 5 MMOL/L (ref 5–15)
APTT PPP: 26.5 SEC (ref 24.5–34.5)
BILIRUB SERPL-MCNC: 0.5 MG/DL (ref 0.2–1)
BUN SERPL-MCNC: 15 MG/DL (ref 9–23)
BUN/CREAT SERPL: 13.6 (ref 10–20)
CALCIUM SERPL-MCNC: 10 MG/DL (ref 8.7–10.4)
CHLORIDE SERPL-SCNC: 109 MMOL/L (ref 98–107)
CO2 SERPL-SCNC: 27 MMOL/L (ref 20–31)
GLUCOSE SERPL-MCNC: 110 MG/DL (ref 74–106)
HCT VFR BLD AUTO: 46.6 % (ref 41–53)
HGB BLD-MCNC: 15.9 G/DL (ref 13.5–17.5)
INR PPP: 1.05 (ref 0.9–1.15)
MCH RBC QN AUTO: 27.8 PG (ref 28–32)
MCV RBC AUTO: 81.6 FL (ref 80–100)
NRBC BLD QL AUTO: 0 %
POTASSIUM SERPL-SCNC: 4.4 MMOL/L (ref 3.5–5.1)
PROT SERPL-MCNC: 6.4 G/DL (ref 5.7–8.2)
PROTHROMBIN TIME: 11.1 SEC (ref 9.3–11.8)
SODIUM SERPL-SCNC: 141 MMOL/L (ref 136–145)

## 2024-10-22 PROCEDURE — 4A023N7 MEASUREMENT OF CARDIAC SAMPLING AND PRESSURE, LEFT HEART, PERCUTANEOUS APPROACH: ICD-10-PCS | Performed by: STUDENT IN AN ORGANIZED HEALTH CARE EDUCATION/TRAINING PROGRAM

## 2024-10-22 PROCEDURE — B211YZZ FLUOROSCOPY OF MULTIPLE CORONARY ARTERIES USING OTHER CONTRAST: ICD-10-PCS | Performed by: STUDENT IN AN ORGANIZED HEALTH CARE EDUCATION/TRAINING PROGRAM

## 2024-10-22 RX ADMIN — DEXTROSE ONE UNITS: 5 SOLUTION INTRAVENOUS at 10:15

## 2024-10-22 RX ADMIN — HEPARIN SODIUM ONE MLS/HR: 200 INJECTION, SOLUTION INTRAVENOUS at 07:49

## 2024-10-22 RX ADMIN — FENTANYL CITRATE ONE MCG: 50 INJECTION, SOLUTION INTRAMUSCULAR; INTRAVENOUS at 09:28

## 2024-10-22 RX ADMIN — HEPARIN SODIUM ONE MLS/HR: 200 INJECTION, SOLUTION INTRAVENOUS at 10:14

## 2024-10-22 RX ADMIN — VERAPAMIL HYDROCHLORIDE ONE MG: 2.5 INJECTION, SOLUTION INTRAVENOUS at 10:14

## 2024-10-22 RX ADMIN — LIDOCAINE HYDROCHLORIDE ONE ML: 20 INJECTION, SOLUTION INFILTRATION; PERINEURAL at 09:28

## 2024-10-22 RX ADMIN — IODIXANOL ONE MG: 320 INJECTION, SOLUTION INTRAVASCULAR at 07:49

## 2024-10-22 RX ADMIN — SODIUM CHLORIDE ONE MG: 9 INJECTION, SOLUTION INTRAVENOUS at 09:27

## 2024-10-22 RX ADMIN — MIDAZOLAM HYDROCHLORIDE ONE MG: 1 INJECTION, SOLUTION INTRAMUSCULAR; INTRAVENOUS at 09:28

## 2024-10-22 RX ADMIN — SODIUM CHLORIDE ONE MLS/HR: 9 INJECTION, SOLUTION INTRAVENOUS at 09:28

## 2024-10-23 VITALS — OXYGEN SATURATION: 96 % | RESPIRATION RATE: 17 BRPM | HEART RATE: 73 BPM

## 2024-10-23 VITALS
RESPIRATION RATE: 18 BRPM | DIASTOLIC BLOOD PRESSURE: 84 MMHG | SYSTOLIC BLOOD PRESSURE: 140 MMHG | TEMPERATURE: 97.4 F | HEART RATE: 62 BPM | OXYGEN SATURATION: 95 %

## 2024-10-23 VITALS
HEART RATE: 60 BPM | OXYGEN SATURATION: 97 % | TEMPERATURE: 97.9 F | DIASTOLIC BLOOD PRESSURE: 78 MMHG | SYSTOLIC BLOOD PRESSURE: 128 MMHG | RESPIRATION RATE: 17 BRPM

## 2024-10-23 VITALS
HEART RATE: 62 BPM | SYSTOLIC BLOOD PRESSURE: 140 MMHG | RESPIRATION RATE: 18 BRPM | OXYGEN SATURATION: 95 % | TEMPERATURE: 97.4 F | DIASTOLIC BLOOD PRESSURE: 84 MMHG

## 2024-10-23 VITALS
RESPIRATION RATE: 18 BRPM | HEART RATE: 57 BPM | DIASTOLIC BLOOD PRESSURE: 70 MMHG | OXYGEN SATURATION: 98 % | SYSTOLIC BLOOD PRESSURE: 116 MMHG | TEMPERATURE: 97.9 F

## 2024-10-23 VITALS
TEMPERATURE: 97.6 F | SYSTOLIC BLOOD PRESSURE: 116 MMHG | DIASTOLIC BLOOD PRESSURE: 71 MMHG | OXYGEN SATURATION: 99 % | HEART RATE: 50 BPM | RESPIRATION RATE: 17 BRPM

## 2024-10-23 LAB
ALBUMIN SERPL-MCNC: 4.1 G/DL (ref 3.2–4.8)
ALP SERPL-CCNC: 66 U/L (ref 46–116)
ALT SERPL-CCNC: 28 U/L (ref 7–40)
ANION GAP SERPL CALCULATED.3IONS-SCNC: 8 MMOL/L (ref 5–15)
BILIRUB SERPL-MCNC: 0.5 MG/DL (ref 0.2–1)
BUN SERPL-MCNC: 17 MG/DL (ref 9–23)
BUN/CREAT SERPL: 15 (ref 10–20)
CALCIUM SERPL-MCNC: 10.6 MG/DL (ref 8.7–10.4)
CHLORIDE SERPL-SCNC: 108 MMOL/L (ref 98–107)
CO2 SERPL-SCNC: 25 MMOL/L (ref 20–31)
GLUCOSE SERPL-MCNC: 119 MG/DL (ref 74–106)
HCT VFR BLD AUTO: 46.7 % (ref 41–53)
HGB BLD-MCNC: 16 G/DL (ref 13.5–17.5)
MCH RBC QN AUTO: 28 PG (ref 28–32)
MCV RBC AUTO: 81.8 FL (ref 80–100)
NRBC BLD QL AUTO: 0.1 %
POTASSIUM SERPL-SCNC: 4.2 MMOL/L (ref 3.5–5.1)
PROT SERPL-MCNC: 6.6 G/DL (ref 5.7–8.2)
SODIUM SERPL-SCNC: 141 MMOL/L (ref 136–145)

## 2025-06-21 ENCOUNTER — HOSPITAL ENCOUNTER (EMERGENCY)
Dept: HOSPITAL 15 - ER | Age: 65
Discharge: HOME | End: 2025-06-21
Payer: COMMERCIAL

## 2025-06-21 VITALS — BODY MASS INDEX: 35.05 KG/M2 | WEIGHT: 223.33 LBS | HEIGHT: 67 IN

## 2025-06-21 VITALS
DIASTOLIC BLOOD PRESSURE: 67 MMHG | RESPIRATION RATE: 20 BRPM | HEART RATE: 72 BPM | TEMPERATURE: 98.6 F | SYSTOLIC BLOOD PRESSURE: 142 MMHG | OXYGEN SATURATION: 96 %

## 2025-06-21 DIAGNOSIS — B97.89: ICD-10-CM

## 2025-06-21 DIAGNOSIS — I25.10: ICD-10-CM

## 2025-06-21 DIAGNOSIS — Z20.822: ICD-10-CM

## 2025-06-21 DIAGNOSIS — E03.9: ICD-10-CM

## 2025-06-21 DIAGNOSIS — J06.9: Primary | ICD-10-CM

## 2025-06-21 DIAGNOSIS — J02.9: ICD-10-CM

## 2025-06-21 DIAGNOSIS — I10: ICD-10-CM

## 2025-06-21 DIAGNOSIS — Z79.899: ICD-10-CM

## 2025-06-21 LAB
FLUAV AG UPPER RESP QL IA.RAPID: NEGATIVE
FLUBV AG SPEC QL: NEGATIVE
S PYO AG THROAT QL IA.RAPID: NEGATIVE
SARS-COV+SARS-COV-2 AG RESP QL IA.RAPID: NEGATIVE

## 2025-06-21 PROCEDURE — 87804 INFLUENZA ASSAY W/OPTIC: CPT

## 2025-06-21 PROCEDURE — 87880 STREP A ASSAY W/OPTIC: CPT

## 2025-06-21 PROCEDURE — 87070 CULTURE OTHR SPECIMN AEROBIC: CPT

## 2025-06-21 PROCEDURE — 87426 SARSCOV CORONAVIRUS AG IA: CPT

## 2025-06-21 PROCEDURE — 93005 ELECTROCARDIOGRAM TRACING: CPT

## 2025-06-21 PROCEDURE — 36415 COLL VENOUS BLD VENIPUNCTURE: CPT

## 2025-06-21 PROCEDURE — 76536 US EXAM OF HEAD AND NECK: CPT
